# Patient Record
(demographics unavailable — no encounter records)

---

## 2025-01-22 NOTE — PHYSICAL EXAM
[Chaperone Present] : A chaperone was present in the examining room during all aspects of the physical examination [[id number]] : A chaperone was present during the pelvic exam. [FreeTextEntry2] : daljit [Appropriately responsive] : appropriately responsive [Alert] : alert [No Acute Distress] : no acute distress [No Lymphadenopathy] : no lymphadenopathy [Regular Rate Rhythm] : regular rate rhythm [No Murmurs] : no murmurs [Clear to Auscultation B/L] : clear to auscultation bilaterally [Soft] : soft [Non-tender] : non-tender [Non-distended] : non-distended [No HSM] : No HSM [No Lesions] : no lesions [No Mass] : no mass [Oriented x3] : oriented x3 [Examination Of The Breasts] : a normal appearance [No Masses] : no breast masses were palpable [Vulvar Atrophy] : vulvar atrophy [Labia Majora] : normal [Labia Minora] : normal [Atrophy] : atrophy [Normal] : normal [Adnexa Tenderness On The Left] : tender [Declined] : Patient declined rectal exam

## 2025-01-22 NOTE — HISTORY OF PRESENT ILLNESS
[N] : Patient is not sexually active [Y] : Positive pregnancy history [Menarche Age: ____] : age at menarche was [unfilled] [FreeTextEntry1] : 60-year-old -0-0-3 presents with left lower quadrant pain radiating to the back for the past couple of years.  She denies vaginal discharge, dysuria or postmenopausal bleeding. [PGHxTotal] : 3 [[facility]] : 3 [PGHxPremature] : 0 [PGHxAbortions] : 0 [[facility]] : [facility] [PGHxABInduced] : 0 [PGHxABSpont] : 0 [PGHxEctopic] : 0 [PGHxMultBirths] : 0

## 2025-01-22 NOTE — DISCUSSION/SUMMARY
[FreeTextEntry1] : 60-year-old G3, P3 presents with chronic left lower quadrant pain.  Denies discharge or vaginal bleeding or dysuria.  Abdominal ultrasound was essentially normal and mammogram was normal.  Pelvic examination shows tenderness around the bone of the pelvis on the left.  No soft tissue mass noted.  Atrophic changes noted.  Pap GC chlamydia sent and pelvic sonogram ordered.  Patient to return in 2 to 4 weeks for follow-up.

## 2025-02-18 NOTE — DISCUSSION/SUMMARY
[FreeTextEntry1] : 61-year-old -0-0-2 presents for follow-up care.  Pelvic pain has subsided and has actually stopped.  Pelvic sonogram is normal.  Mammogram and Pap smear are normal.  Patient to return in 1 year for follow-up and all her questions answered.

## 2025-02-18 NOTE — HISTORY OF PRESENT ILLNESS
[FreeTextEntry1] : 61-year-old -0-0-2 presents for follow-up care.  States that her pelvic pain has subsided.  Pelvic sonogram is normal.  Mammogram and Pap smear are normal.